# Patient Record
Sex: FEMALE | Race: OTHER | NOT HISPANIC OR LATINO
[De-identification: names, ages, dates, MRNs, and addresses within clinical notes are randomized per-mention and may not be internally consistent; named-entity substitution may affect disease eponyms.]

---

## 2023-02-01 ENCOUNTER — APPOINTMENT (OUTPATIENT)
Dept: NEUROLOGY | Facility: CLINIC | Age: 23
End: 2023-02-01
Payer: COMMERCIAL

## 2023-02-01 ENCOUNTER — NON-APPOINTMENT (OUTPATIENT)
Age: 23
End: 2023-02-01

## 2023-02-01 VITALS
HEIGHT: 62 IN | DIASTOLIC BLOOD PRESSURE: 74 MMHG | OXYGEN SATURATION: 97 % | HEART RATE: 118 BPM | WEIGHT: 116 LBS | SYSTOLIC BLOOD PRESSURE: 110 MMHG | BODY MASS INDEX: 21.35 KG/M2 | TEMPERATURE: 96.7 F

## 2023-02-01 DIAGNOSIS — G47.411 NARCOLEPSY WITH CATAPLEXY: ICD-10-CM

## 2023-02-01 PROBLEM — Z00.00 ENCOUNTER FOR PREVENTIVE HEALTH EXAMINATION: Status: ACTIVE | Noted: 2023-02-01

## 2023-02-01 PROCEDURE — 99204 OFFICE O/P NEW MOD 45 MIN: CPT

## 2023-02-01 NOTE — ASSESSMENT
[FreeTextEntry1] : 22-year-old woman with 4 lifetime episodes of lost tone, with possible alteration of awareness.\par Differential includes atonic seizures, hypo/hyperkalemic or thyrotoxic periodic paralysis, movement disorder, functional neurologic disorder.\par \par -MRI brain\par -Routine EEG\par -EKG \par -If episodes become more frequent, will admit to EMU to record and check labs during an episode

## 2023-02-01 NOTE — HISTORY OF PRESENT ILLNESS
[FreeTextEntry1] : 22-year-old woman presenting with episodic loss of muscle tone.\par \par First episode was in 5th grade, lasted about 2 minutes, left arm and leg went numb and she had no control over them.  Second episode was in high school, lost control of her body and fell backwards, felt a tear in her eye and was not able to wipe it away because her hand was weak.  Third episode was in college, this one was also a full body episode, she was with her father and was not able to speak.  He brought her to ED where evaluation was normal.  More recently she was in bed taking a nap then woke up and was unable to move, thought it may have been sleep paralysis but her friends recommended getting it evaluated.  She saw Dr. Jenifer Peterson at [facility] who recommended MRI, EEG, and neuropsych testing but these have not been completed.

## 2023-02-09 ENCOUNTER — APPOINTMENT (OUTPATIENT)
Dept: NEUROLOGY | Facility: CLINIC | Age: 23
End: 2023-02-09
Payer: COMMERCIAL

## 2023-02-09 PROCEDURE — 95819 EEG AWAKE AND ASLEEP: CPT

## 2023-06-22 ENCOUNTER — APPOINTMENT (OUTPATIENT)
Dept: NEUROLOGY | Facility: CLINIC | Age: 23
End: 2023-06-22